# Patient Record
Sex: FEMALE | Race: WHITE | NOT HISPANIC OR LATINO | URBAN - METROPOLITAN AREA
[De-identification: names, ages, dates, MRNs, and addresses within clinical notes are randomized per-mention and may not be internally consistent; named-entity substitution may affect disease eponyms.]

---

## 2019-12-11 ENCOUNTER — OFFICE VISIT (OUTPATIENT)
Dept: URBAN - METROPOLITAN AREA CLINIC 45 | Facility: CLINIC | Age: 49
End: 2019-12-11
Payer: MEDICARE

## 2019-12-11 DIAGNOSIS — H53.19 OTHER SUBJECTIVE VISUAL DISTURBANCES: ICD-10-CM

## 2019-12-11 DIAGNOSIS — H04.123 DRY EYE SYNDROME OF BILATERAL LACRIMAL GLANDS: ICD-10-CM

## 2019-12-11 DIAGNOSIS — H43.812 VITREOUS DEGENERATION, LEFT EYE: Primary | ICD-10-CM

## 2019-12-11 DIAGNOSIS — H52.4 PRESBYOPIA: ICD-10-CM

## 2019-12-11 PROCEDURE — 92014 COMPRE OPH EXAM EST PT 1/>: CPT | Performed by: OPTOMETRIST

## 2019-12-11 ASSESSMENT — INTRAOCULAR PRESSURE
OS: 13
OD: 12

## 2019-12-11 ASSESSMENT — KERATOMETRY
OS: 44.13
OD: 43.38

## 2019-12-11 NOTE — IMPRESSION/PLAN
Impression: Other subjective visual disturbances: H53.19.  Plan: refer to [place] [id number] for [employer]

## 2019-12-11 NOTE — IMPRESSION/PLAN
Impression: Vitreous degeneration, left eye: H43.812. Plan: Discussed signs and symptoms of PVD/floaters. Will continue to observe condition and or symptoms.